# Patient Record
Sex: FEMALE | Race: OTHER | HISPANIC OR LATINO
[De-identification: names, ages, dates, MRNs, and addresses within clinical notes are randomized per-mention and may not be internally consistent; named-entity substitution may affect disease eponyms.]

---

## 2018-06-13 ENCOUNTER — ASOB RESULT (OUTPATIENT)
Age: 33
End: 2018-06-13

## 2018-06-13 ENCOUNTER — APPOINTMENT (OUTPATIENT)
Dept: ANTEPARTUM | Facility: CLINIC | Age: 33
End: 2018-06-13
Payer: MEDICAID

## 2018-06-13 PROBLEM — Z00.00 ENCOUNTER FOR PREVENTIVE HEALTH EXAMINATION: Status: ACTIVE | Noted: 2018-06-13

## 2018-06-13 PROCEDURE — 76817 TRANSVAGINAL US OBSTETRIC: CPT

## 2018-08-16 ENCOUNTER — APPOINTMENT (OUTPATIENT)
Dept: ANTEPARTUM | Facility: CLINIC | Age: 33
End: 2018-08-16
Payer: MEDICAID

## 2018-08-16 ENCOUNTER — ASOB RESULT (OUTPATIENT)
Age: 33
End: 2018-08-16

## 2018-08-16 PROCEDURE — 76817 TRANSVAGINAL US OBSTETRIC: CPT

## 2018-08-16 PROCEDURE — 76811 OB US DETAILED SNGL FETUS: CPT

## 2018-08-29 ENCOUNTER — APPOINTMENT (OUTPATIENT)
Dept: ANTEPARTUM | Facility: CLINIC | Age: 33
End: 2018-08-29
Payer: MEDICAID

## 2018-08-29 ENCOUNTER — ASOB RESULT (OUTPATIENT)
Age: 33
End: 2018-08-29

## 2018-08-29 PROCEDURE — 76816 OB US FOLLOW-UP PER FETUS: CPT

## 2018-12-20 ENCOUNTER — OUTPATIENT (OUTPATIENT)
Dept: OUTPATIENT SERVICES | Facility: HOSPITAL | Age: 33
LOS: 1 days | End: 2018-12-20
Payer: MEDICAID

## 2018-12-20 ENCOUNTER — APPOINTMENT (OUTPATIENT)
Dept: ANTEPARTUM | Facility: CLINIC | Age: 33
End: 2018-12-20
Payer: MEDICAID

## 2018-12-20 ENCOUNTER — ASOB RESULT (OUTPATIENT)
Age: 33
End: 2018-12-20

## 2018-12-20 DIAGNOSIS — Z01.818 ENCOUNTER FOR OTHER PREPROCEDURAL EXAMINATION: ICD-10-CM

## 2018-12-20 LAB
APPEARANCE UR: CLEAR — SIGNIFICANT CHANGE UP
BASOPHILS # BLD AUTO: 0 K/UL — SIGNIFICANT CHANGE UP (ref 0–0.2)
BASOPHILS NFR BLD AUTO: 0.2 % — SIGNIFICANT CHANGE UP (ref 0–2)
BILIRUB UR-MCNC: NEGATIVE — SIGNIFICANT CHANGE UP
BLD GP AB SCN SERPL QL: SIGNIFICANT CHANGE UP
COLOR SPEC: YELLOW — SIGNIFICANT CHANGE UP
DIFF PNL FLD: ABNORMAL
EOSINOPHIL # BLD AUTO: 0 K/UL — SIGNIFICANT CHANGE UP (ref 0–0.5)
EOSINOPHIL NFR BLD AUTO: 0.5 % — SIGNIFICANT CHANGE UP (ref 0–6)
EPI CELLS # UR: SIGNIFICANT CHANGE UP
GLUCOSE UR QL: NEGATIVE MG/DL — SIGNIFICANT CHANGE UP
HCT VFR BLD CALC: 32.4 % — LOW (ref 37–47)
HGB BLD-MCNC: 10.1 G/DL — LOW (ref 12–16)
KETONES UR-MCNC: NEGATIVE — SIGNIFICANT CHANGE UP
LEUKOCYTE ESTERASE UR-ACNC: NEGATIVE — SIGNIFICANT CHANGE UP
LYMPHOCYTES # BLD AUTO: 1.9 K/UL — SIGNIFICANT CHANGE UP (ref 1–4.8)
LYMPHOCYTES # BLD AUTO: 21.3 % — SIGNIFICANT CHANGE UP (ref 20–55)
MCHC RBC-ENTMCNC: 26.4 PG — LOW (ref 27–31)
MCHC RBC-ENTMCNC: 31.2 G/DL — LOW (ref 32–36)
MCV RBC AUTO: 84.8 FL — SIGNIFICANT CHANGE UP (ref 81–99)
MONOCYTES # BLD AUTO: 0.8 K/UL — SIGNIFICANT CHANGE UP (ref 0–0.8)
MONOCYTES NFR BLD AUTO: 9.3 % — SIGNIFICANT CHANGE UP (ref 3–10)
NEUTROPHILS # BLD AUTO: 6 K/UL — SIGNIFICANT CHANGE UP (ref 1.8–8)
NEUTROPHILS NFR BLD AUTO: 68.5 % — SIGNIFICANT CHANGE UP (ref 37–73)
NITRITE UR-MCNC: NEGATIVE — SIGNIFICANT CHANGE UP
PH UR: 7 — SIGNIFICANT CHANGE UP (ref 5–8)
PLATELET # BLD AUTO: 386 K/UL — SIGNIFICANT CHANGE UP (ref 150–400)
PROT UR-MCNC: NEGATIVE MG/DL — SIGNIFICANT CHANGE UP
RBC # BLD: 3.82 M/UL — LOW (ref 4.4–5.2)
RBC # FLD: 14.6 % — SIGNIFICANT CHANGE UP (ref 11–15.6)
RBC CASTS # UR COMP ASSIST: SIGNIFICANT CHANGE UP /HPF (ref 0–4)
SP GR SPEC: 1 — LOW (ref 1.01–1.02)
TYPE + AB SCN PNL BLD: SIGNIFICANT CHANGE UP
UROBILINOGEN FLD QL: NEGATIVE MG/DL — SIGNIFICANT CHANGE UP
WBC # BLD: 8.7 K/UL — SIGNIFICANT CHANGE UP (ref 4.8–10.8)
WBC # FLD AUTO: 8.7 K/UL — SIGNIFICANT CHANGE UP (ref 4.8–10.8)
WBC UR QL: SIGNIFICANT CHANGE UP

## 2018-12-20 PROCEDURE — 86901 BLOOD TYPING SEROLOGIC RH(D): CPT

## 2018-12-20 PROCEDURE — 86900 BLOOD TYPING SEROLOGIC ABO: CPT

## 2018-12-20 PROCEDURE — 36415 COLL VENOUS BLD VENIPUNCTURE: CPT

## 2018-12-20 PROCEDURE — 81001 URINALYSIS AUTO W/SCOPE: CPT

## 2018-12-20 PROCEDURE — 76816 OB US FOLLOW-UP PER FETUS: CPT

## 2018-12-20 PROCEDURE — 86850 RBC ANTIBODY SCREEN: CPT

## 2018-12-20 PROCEDURE — 85027 COMPLETE CBC AUTOMATED: CPT

## 2018-12-20 PROCEDURE — 86780 TREPONEMA PALLIDUM: CPT

## 2018-12-21 LAB — T PALLIDUM AB TITR SER: NEGATIVE — SIGNIFICANT CHANGE UP

## 2018-12-27 VITALS
SYSTOLIC BLOOD PRESSURE: 103 MMHG | DIASTOLIC BLOOD PRESSURE: 51 MMHG | TEMPERATURE: 98 F | HEART RATE: 83 BPM | RESPIRATION RATE: 18 BRPM

## 2018-12-27 VITALS — HEIGHT: 66 IN | WEIGHT: 127.87 LBS

## 2018-12-28 ENCOUNTER — TRANSCRIPTION ENCOUNTER (OUTPATIENT)
Age: 33
End: 2018-12-28

## 2018-12-29 ENCOUNTER — RESULT REVIEW (OUTPATIENT)
Age: 33
End: 2018-12-29

## 2018-12-29 ENCOUNTER — TRANSCRIPTION ENCOUNTER (OUTPATIENT)
Age: 33
End: 2018-12-29

## 2018-12-29 ENCOUNTER — INPATIENT (INPATIENT)
Facility: HOSPITAL | Age: 33
LOS: 1 days | Discharge: HOME | End: 2018-12-31
Attending: OBSTETRICS & GYNECOLOGY | Admitting: OBSTETRICS & GYNECOLOGY
Payer: MEDICAID

## 2018-12-29 VITALS
RESPIRATION RATE: 18 BRPM | DIASTOLIC BLOOD PRESSURE: 66 MMHG | SYSTOLIC BLOOD PRESSURE: 113 MMHG | TEMPERATURE: 98 F | WEIGHT: 171.96 LBS | HEART RATE: 78 BPM | HEIGHT: 66 IN

## 2018-12-29 DIAGNOSIS — O34.219 MATERNAL CARE FOR UNSPECIFIED TYPE SCAR FROM PREVIOUS CESAREAN DELIVERY: ICD-10-CM

## 2018-12-29 DIAGNOSIS — O34.211 MATERNAL CARE FOR LOW TRANSVERSE SCAR FROM PREVIOUS CESAREAN DELIVERY: ICD-10-CM

## 2018-12-29 LAB
BLD GP AB SCN SERPL QL: SIGNIFICANT CHANGE UP
TYPE + AB SCN PNL BLD: SIGNIFICANT CHANGE UP

## 2018-12-29 PROCEDURE — 88304 TISSUE EXAM BY PATHOLOGIST: CPT | Mod: 26

## 2018-12-29 PROCEDURE — 88302 TISSUE EXAM BY PATHOLOGIST: CPT | Mod: 26

## 2018-12-29 RX ORDER — SODIUM CHLORIDE 9 MG/ML
1000 INJECTION, SOLUTION INTRAVENOUS
Qty: 0 | Refills: 0 | Status: DISCONTINUED | OUTPATIENT
Start: 2018-12-29 | End: 2018-12-31

## 2018-12-29 RX ORDER — SODIUM CHLORIDE 9 MG/ML
1000 INJECTION, SOLUTION INTRAVENOUS
Qty: 0 | Refills: 0 | Status: DISCONTINUED | OUTPATIENT
Start: 2018-12-29 | End: 2018-12-29

## 2018-12-29 RX ORDER — OXYTOCIN 10 UNIT/ML
41.67 VIAL (ML) INJECTION
Qty: 20 | Refills: 0 | Status: DISCONTINUED | OUTPATIENT
Start: 2018-12-29 | End: 2018-12-31

## 2018-12-29 RX ORDER — IBUPROFEN 200 MG
600 TABLET ORAL EVERY 6 HOURS
Qty: 0 | Refills: 0 | Status: DISCONTINUED | OUTPATIENT
Start: 2018-12-29 | End: 2018-12-31

## 2018-12-29 RX ORDER — FERROUS SULFATE 325(65) MG
325 TABLET ORAL DAILY
Qty: 0 | Refills: 0 | Status: DISCONTINUED | OUTPATIENT
Start: 2018-12-29 | End: 2018-12-31

## 2018-12-29 RX ORDER — TETANUS TOXOID, REDUCED DIPHTHERIA TOXOID AND ACELLULAR PERTUSSIS VACCINE, ADSORBED 5; 2.5; 8; 8; 2.5 [IU]/.5ML; [IU]/.5ML; UG/.5ML; UG/.5ML; UG/.5ML
0.5 SUSPENSION INTRAMUSCULAR ONCE
Qty: 0 | Refills: 0 | Status: COMPLETED | OUTPATIENT
Start: 2018-12-29

## 2018-12-29 RX ORDER — DIPHENHYDRAMINE HCL 50 MG
25 CAPSULE ORAL EVERY 6 HOURS
Qty: 0 | Refills: 0 | Status: DISCONTINUED | OUTPATIENT
Start: 2018-12-29 | End: 2018-12-31

## 2018-12-29 RX ORDER — OXYTOCIN 10 UNIT/ML
41.67 VIAL (ML) INJECTION
Qty: 20 | Refills: 0 | Status: DISCONTINUED | OUTPATIENT
Start: 2018-12-29 | End: 2018-12-29

## 2018-12-29 RX ORDER — NALOXONE HYDROCHLORIDE 4 MG/.1ML
0.1 SPRAY NASAL
Qty: 0 | Refills: 0 | Status: DISCONTINUED | OUTPATIENT
Start: 2018-12-29 | End: 2018-12-31

## 2018-12-29 RX ORDER — ACETAMINOPHEN 500 MG
1000 TABLET ORAL ONCE
Qty: 0 | Refills: 0 | Status: DISCONTINUED | OUTPATIENT
Start: 2018-12-29 | End: 2018-12-31

## 2018-12-29 RX ORDER — CEFAZOLIN SODIUM 1 G
2000 VIAL (EA) INJECTION ONCE
Qty: 0 | Refills: 0 | Status: COMPLETED | OUTPATIENT
Start: 2018-12-29 | End: 2018-12-29

## 2018-12-29 RX ORDER — ONDANSETRON 8 MG/1
4 TABLET, FILM COATED ORAL EVERY 6 HOURS
Qty: 0 | Refills: 0 | Status: DISCONTINUED | OUTPATIENT
Start: 2018-12-29 | End: 2018-12-31

## 2018-12-29 RX ORDER — SODIUM CHLORIDE 9 MG/ML
1000 INJECTION, SOLUTION INTRAVENOUS ONCE
Qty: 0 | Refills: 0 | Status: COMPLETED | OUTPATIENT
Start: 2018-12-29 | End: 2018-12-29

## 2018-12-29 RX ORDER — SIMETHICONE 80 MG/1
80 TABLET, CHEWABLE ORAL EVERY 4 HOURS
Qty: 0 | Refills: 0 | Status: DISCONTINUED | OUTPATIENT
Start: 2018-12-29 | End: 2018-12-31

## 2018-12-29 RX ORDER — LANOLIN
1 OINTMENT (GRAM) TOPICAL
Qty: 0 | Refills: 0 | Status: DISCONTINUED | OUTPATIENT
Start: 2018-12-29 | End: 2018-12-31

## 2018-12-29 RX ORDER — DOCUSATE SODIUM 100 MG
100 CAPSULE ORAL
Qty: 0 | Refills: 0 | Status: DISCONTINUED | OUTPATIENT
Start: 2018-12-29 | End: 2018-12-31

## 2018-12-29 RX ORDER — METOCLOPRAMIDE HCL 10 MG
10 TABLET ORAL ONCE
Qty: 0 | Refills: 0 | Status: DISCONTINUED | OUTPATIENT
Start: 2018-12-29 | End: 2018-12-29

## 2018-12-29 RX ORDER — KETOROLAC TROMETHAMINE 30 MG/ML
30 SYRINGE (ML) INJECTION EVERY 6 HOURS
Qty: 0 | Refills: 0 | Status: DISCONTINUED | OUTPATIENT
Start: 2018-12-29 | End: 2018-12-30

## 2018-12-29 RX ORDER — INFLUENZA VIRUS VACCINE 15; 15; 15; 15 UG/.5ML; UG/.5ML; UG/.5ML; UG/.5ML
0.5 SUSPENSION INTRAMUSCULAR ONCE
Qty: 0 | Refills: 0 | Status: COMPLETED | OUTPATIENT
Start: 2018-12-29 | End: 2018-12-31

## 2018-12-29 RX ORDER — GLYCERIN ADULT
1 SUPPOSITORY, RECTAL RECTAL AT BEDTIME
Qty: 0 | Refills: 0 | Status: DISCONTINUED | OUTPATIENT
Start: 2018-12-29 | End: 2018-12-31

## 2018-12-29 RX ORDER — OXYCODONE AND ACETAMINOPHEN 5; 325 MG/1; MG/1
1 TABLET ORAL
Qty: 0 | Refills: 0 | Status: DISCONTINUED | OUTPATIENT
Start: 2018-12-29 | End: 2018-12-31

## 2018-12-29 RX ORDER — CITRIC ACID/SODIUM CITRATE 300-500 MG
30 SOLUTION, ORAL ORAL ONCE
Qty: 0 | Refills: 0 | Status: COMPLETED | OUTPATIENT
Start: 2018-12-29 | End: 2018-12-29

## 2018-12-29 RX ORDER — OXYCODONE HYDROCHLORIDE 5 MG/1
10 TABLET ORAL
Qty: 0 | Refills: 0 | Status: DISCONTINUED | OUTPATIENT
Start: 2018-12-29 | End: 2018-12-31

## 2018-12-29 RX ORDER — OXYCODONE HYDROCHLORIDE 5 MG/1
5 TABLET ORAL
Qty: 0 | Refills: 0 | Status: DISCONTINUED | OUTPATIENT
Start: 2018-12-29 | End: 2018-12-31

## 2018-12-29 RX ORDER — DIPHENHYDRAMINE HCL 50 MG
50 CAPSULE ORAL EVERY 4 HOURS
Qty: 0 | Refills: 0 | Status: DISCONTINUED | OUTPATIENT
Start: 2018-12-29 | End: 2018-12-31

## 2018-12-29 RX ORDER — ONDANSETRON 8 MG/1
4 TABLET, FILM COATED ORAL EVERY 6 HOURS
Qty: 0 | Refills: 0 | Status: DISCONTINUED | OUTPATIENT
Start: 2018-12-29 | End: 2018-12-29

## 2018-12-29 RX ORDER — KETOROLAC TROMETHAMINE 30 MG/ML
30 SYRINGE (ML) INJECTION ONCE
Qty: 0 | Refills: 0 | Status: DISCONTINUED | OUTPATIENT
Start: 2018-12-29 | End: 2018-12-29

## 2018-12-29 RX ORDER — ENOXAPARIN SODIUM 100 MG/ML
40 INJECTION SUBCUTANEOUS EVERY 24 HOURS
Qty: 0 | Refills: 0 | Status: DISCONTINUED | OUTPATIENT
Start: 2018-12-29 | End: 2018-12-31

## 2018-12-29 RX ORDER — OXYCODONE AND ACETAMINOPHEN 5; 325 MG/1; MG/1
2 TABLET ORAL EVERY 6 HOURS
Qty: 0 | Refills: 0 | Status: DISCONTINUED | OUTPATIENT
Start: 2018-12-29 | End: 2018-12-31

## 2018-12-29 RX ORDER — OXYTOCIN 10 UNIT/ML
333.33 VIAL (ML) INJECTION
Qty: 20 | Refills: 0 | Status: DISCONTINUED | OUTPATIENT
Start: 2018-12-29 | End: 2018-12-31

## 2018-12-29 RX ADMIN — SODIUM CHLORIDE 2000 MILLILITER(S): 9 INJECTION, SOLUTION INTRAVENOUS at 08:11

## 2018-12-29 RX ADMIN — Medication 30 MILLIGRAM(S): at 14:15

## 2018-12-29 RX ADMIN — ENOXAPARIN SODIUM 40 MILLIGRAM(S): 100 INJECTION SUBCUTANEOUS at 23:10

## 2018-12-29 RX ADMIN — Medication 100 MILLIGRAM(S): at 10:14

## 2018-12-29 RX ADMIN — SODIUM CHLORIDE 125 MILLILITER(S): 9 INJECTION, SOLUTION INTRAVENOUS at 20:37

## 2018-12-29 RX ADMIN — Medication 30 MILLILITER(S): at 10:02

## 2018-12-29 RX ADMIN — Medication 30 MILLIGRAM(S): at 20:54

## 2018-12-29 RX ADMIN — SODIUM CHLORIDE 125 MILLILITER(S): 9 INJECTION, SOLUTION INTRAVENOUS at 08:58

## 2018-12-29 RX ADMIN — Medication 30 MILLIGRAM(S): at 14:30

## 2018-12-29 RX ADMIN — Medication 30 MILLIGRAM(S): at 20:39

## 2018-12-29 NOTE — DISCHARGE NOTE OB - PLAN OF CARE
Patient should transition to regular activity level. Resume regular diet. Patient should follow up with her OB for wound check in 1-2 weeks. Patient should call her doctor sooner if she develops fever or uncontrolled vaginal bleeding. Please call sooner if there are any other concerns. rapid recovery

## 2018-12-29 NOTE — OB NEONATOLOGY/PEDIATRICIAN DELIVERY SUMMARY - NSPEDSNEONOTESA_OBGYN_ALL_OB_FT
Baby Enrique Marley is a 39 wk FT AGA  born at 1051 on 18 via RC/S del to 34 yo  O+/GBS neg/ RPR NR/ HIV neg/ HepBSAg neg / RI mom with EDC 19.  Mom had good prenatal care.  No significant complications with pregnancy.  Previous C/S x 1.  L&D:  Mom admitted this morning for scheduled RC/S; not in labor; intact membranes; afebrile.  C/S under regional anesthesia.  Clear AF; spontaneous cry; vertex.  Routine care.  AS .  A/P: Term AGA male  delivered by C/S. BW 3760g.  Observe for respiratory distress.  Transition to routine NB care under PMD service.    Kg Ray MD

## 2018-12-29 NOTE — OB PROVIDER H&P - NSHPLABSRESULTS_GEN_ALL_CORE
Complete Blood Count + Automated Diff (12.20.18 @ 13:32)    WBC Count: 8.7 K/uL    Hemoglobin: 10.1 g/dL    Hematocrit: 32.4 %    Platelet Count - Automated: 386 K/uL

## 2018-12-29 NOTE — DISCHARGE NOTE OB - PATIENT PORTAL LINK FT
You can access the [facility] Patient Portal, offered by [facility], by registering with the following website: [email or web address]

## 2018-12-29 NOTE — DISCHARGE NOTE OB - CARE PLAN
Principal Discharge DX:	 delivery delivered  Goal:	rapid recovery  Assessment and plan of treatment:	Patient should transition to regular activity level. Resume regular diet. Patient should follow up with her OB for wound check in 1-2 weeks. Patient should call her doctor sooner if she develops fever or uncontrolled vaginal bleeding. Please call sooner if there are any other concerns.

## 2018-12-29 NOTE — OB RN DELIVERY SUMMARY - BABYS CARE PROVIDER NAME, OB PROFILE
Pt is requesting a refill of Zoloft 100mg; last prescribed by Dr. Flory Lockett on 8/14/18 qty 60 with 1 refill.  Dr. Lockett said pt transferred cared on 10/17 and they are no longer prescribing medications for her. Not quite sure who is to prescribe these now.  Looks like Dr. Haase saw her last on 9/19/18  Would you like to continue therapy?    Thanks!!  Gely Antonio, Pharmacy [facility]  [phone]     [place]

## 2018-12-29 NOTE — DISCHARGE NOTE OB - CARE PROVIDER_API CALL
Hosea Hernandez (MD), Obstetrics and Gynecology  [address]  [address]  [place] [address]  Phone: [phone]  Fax: [phone]

## 2018-12-29 NOTE — DISCHARGE NOTE OB - HOSPITAL COURSE
Pt is a 32 yo  who delivered via repeat  section. She was transferred to postpartum unit without complications during her stay. Upon discharge she is voiding, tolerating PO, ambulating, and pain is controlled.

## 2018-12-29 NOTE — DISCHARGE NOTE OB - MEDICATION SUMMARY - MEDICATIONS TO TAKE
I will START or STAY ON the medications listed below when I get home from the hospital:    Percocet 5/325 oral tablet  -- 1 tab(s) by mouth every 6 hours, As Needed -Moderate Pain (4 - 6) MDD:4  -- Indication: For severe pain    ibuprofen 600 mg oral tablet  -- 1 tab(s) by mouth every 6 hours, As needed, Mild Pain (1 - 3)  -- Indication: For Moderate pain

## 2018-12-29 NOTE — CHART NOTE - NSCHARTNOTEFT_GEN_A_CORE
Attending  Pt presents for an elective repeat c section  Risks and benefits of the surgery were explained  Labs normal

## 2018-12-30 LAB
BASOPHILS # BLD AUTO: 0 K/UL — SIGNIFICANT CHANGE UP (ref 0–0.2)
BASOPHILS # BLD AUTO: 0 K/UL — SIGNIFICANT CHANGE UP (ref 0–0.2)
BASOPHILS NFR BLD AUTO: 0.1 % — SIGNIFICANT CHANGE UP (ref 0–2)
BASOPHILS NFR BLD AUTO: 0.2 % — SIGNIFICANT CHANGE UP (ref 0–2)
EOSINOPHIL # BLD AUTO: 0 K/UL — SIGNIFICANT CHANGE UP (ref 0–0.5)
EOSINOPHIL # BLD AUTO: 0.1 K/UL — SIGNIFICANT CHANGE UP (ref 0–0.5)
EOSINOPHIL NFR BLD AUTO: 0.4 % — SIGNIFICANT CHANGE UP (ref 0–6)
EOSINOPHIL NFR BLD AUTO: 0.9 % — SIGNIFICANT CHANGE UP (ref 0–6)
HCT VFR BLD CALC: 25.1 % — LOW (ref 37–47)
HCT VFR BLD CALC: 26.7 % — LOW (ref 37–47)
HGB BLD-MCNC: 8 G/DL — LOW (ref 12–16)
HGB BLD-MCNC: 8.7 G/DL — LOW (ref 12–16)
LYMPHOCYTES # BLD AUTO: 1.5 K/UL — SIGNIFICANT CHANGE UP (ref 1–4.8)
LYMPHOCYTES # BLD AUTO: 1.7 K/UL — SIGNIFICANT CHANGE UP (ref 1–4.8)
LYMPHOCYTES # BLD AUTO: 12.4 % — LOW (ref 20–55)
LYMPHOCYTES # BLD AUTO: 12.6 % — LOW (ref 20–55)
MCHC RBC-ENTMCNC: 26 PG — LOW (ref 27–31)
MCHC RBC-ENTMCNC: 26.6 PG — LOW (ref 27–31)
MCHC RBC-ENTMCNC: 31.9 G/DL — LOW (ref 32–36)
MCHC RBC-ENTMCNC: 32.6 G/DL — SIGNIFICANT CHANGE UP (ref 32–36)
MCV RBC AUTO: 81.5 FL — SIGNIFICANT CHANGE UP (ref 81–99)
MCV RBC AUTO: 81.7 FL — SIGNIFICANT CHANGE UP (ref 81–99)
MONOCYTES # BLD AUTO: 0.6 K/UL — SIGNIFICANT CHANGE UP (ref 0–0.8)
MONOCYTES # BLD AUTO: 0.8 K/UL — SIGNIFICANT CHANGE UP (ref 0–0.8)
MONOCYTES NFR BLD AUTO: 4.9 % — SIGNIFICANT CHANGE UP (ref 3–10)
MONOCYTES NFR BLD AUTO: 6 % — SIGNIFICANT CHANGE UP (ref 3–10)
NEUTROPHILS # BLD AUTO: 11 K/UL — HIGH (ref 1.8–8)
NEUTROPHILS # BLD AUTO: 9.5 K/UL — HIGH (ref 1.8–8)
NEUTROPHILS NFR BLD AUTO: 80.9 % — HIGH (ref 37–73)
NEUTROPHILS NFR BLD AUTO: 81.2 % — HIGH (ref 37–73)
PLATELET # BLD AUTO: 360 K/UL — SIGNIFICANT CHANGE UP (ref 150–400)
PLATELET # BLD AUTO: 374 K/UL — SIGNIFICANT CHANGE UP (ref 150–400)
RBC # BLD: 3.08 M/UL — LOW (ref 4.4–5.2)
RBC # BLD: 3.27 M/UL — LOW (ref 4.4–5.2)
RBC # FLD: 15 % — SIGNIFICANT CHANGE UP (ref 11–15.6)
RBC # FLD: 15.2 % — SIGNIFICANT CHANGE UP (ref 11–15.6)
T PALLIDUM AB TITR SER: NEGATIVE — SIGNIFICANT CHANGE UP
WBC # BLD: 11.7 K/UL — HIGH (ref 4.8–10.8)
WBC # BLD: 13.6 K/UL — HIGH (ref 4.8–10.8)
WBC # FLD AUTO: 11.7 K/UL — HIGH (ref 4.8–10.8)
WBC # FLD AUTO: 13.6 K/UL — HIGH (ref 4.8–10.8)

## 2018-12-30 RX ADMIN — Medication 325 MILLIGRAM(S): at 15:40

## 2018-12-30 RX ADMIN — ENOXAPARIN SODIUM 40 MILLIGRAM(S): 100 INJECTION SUBCUTANEOUS at 23:19

## 2018-12-30 RX ADMIN — Medication 30 MILLIGRAM(S): at 09:45

## 2018-12-30 RX ADMIN — Medication 30 MILLIGRAM(S): at 02:27

## 2018-12-30 RX ADMIN — SIMETHICONE 80 MILLIGRAM(S): 80 TABLET, CHEWABLE ORAL at 20:39

## 2018-12-30 RX ADMIN — OXYCODONE AND ACETAMINOPHEN 1 TABLET(S): 5; 325 TABLET ORAL at 20:39

## 2018-12-30 RX ADMIN — Medication 100 MILLIGRAM(S): at 09:30

## 2018-12-30 RX ADMIN — OXYCODONE AND ACETAMINOPHEN 1 TABLET(S): 5; 325 TABLET ORAL at 21:30

## 2018-12-30 RX ADMIN — Medication 30 MILLIGRAM(S): at 15:45

## 2018-12-30 RX ADMIN — SIMETHICONE 80 MILLIGRAM(S): 80 TABLET, CHEWABLE ORAL at 09:00

## 2018-12-30 RX ADMIN — Medication 30 MILLIGRAM(S): at 02:12

## 2018-12-30 RX ADMIN — Medication 30 MILLIGRAM(S): at 09:30

## 2018-12-30 RX ADMIN — Medication 30 MILLIGRAM(S): at 16:00

## 2018-12-30 RX ADMIN — Medication 1 TABLET(S): at 15:41

## 2018-12-30 NOTE — PROGRESS NOTE ADULT - ASSESSMENT
33y  s/p uncomplicated repeat CS and BTL POD #1 due to previous CS. Post-op CBC pending collection this AM. VSS.   Flatus pending.

## 2018-12-31 VITALS
SYSTOLIC BLOOD PRESSURE: 108 MMHG | TEMPERATURE: 98 F | DIASTOLIC BLOOD PRESSURE: 70 MMHG | RESPIRATION RATE: 18 BRPM | HEART RATE: 92 BPM

## 2018-12-31 RX ORDER — TETANUS TOXOID, REDUCED DIPHTHERIA TOXOID AND ACELLULAR PERTUSSIS VACCINE, ADSORBED 5; 2.5; 8; 8; 2.5 [IU]/.5ML; [IU]/.5ML; UG/.5ML; UG/.5ML; UG/.5ML
0.5 SUSPENSION INTRAMUSCULAR ONCE
Qty: 0 | Refills: 0 | Status: COMPLETED | OUTPATIENT
Start: 2018-12-31 | End: 2018-12-31

## 2018-12-31 RX ORDER — IBUPROFEN 200 MG
1 TABLET ORAL
Qty: 30 | Refills: 0 | OUTPATIENT
Start: 2018-12-31

## 2018-12-31 RX ADMIN — TETANUS TOXOID, REDUCED DIPHTHERIA TOXOID AND ACELLULAR PERTUSSIS VACCINE, ADSORBED 0.5 MILLILITER(S): 5; 2.5; 8; 8; 2.5 SUSPENSION INTRAMUSCULAR at 12:10

## 2018-12-31 RX ADMIN — Medication 600 MILLIGRAM(S): at 09:30

## 2018-12-31 RX ADMIN — INFLUENZA VIRUS VACCINE 0.5 MILLILITER(S): 15; 15; 15; 15 SUSPENSION INTRAMUSCULAR at 12:02

## 2018-12-31 RX ADMIN — Medication 325 MILLIGRAM(S): at 11:54

## 2018-12-31 RX ADMIN — Medication 1 TABLET(S): at 11:54

## 2018-12-31 RX ADMIN — Medication 600 MILLIGRAM(S): at 08:30

## 2018-12-31 NOTE — PROGRESS NOTE ADULT - ASSESSMENT
33y  s/p uncomplicated repeat CS and BS POD #2 due to previous CS. Post-op CBC reviewed, Hgb dropped from 10.1 > 8.7 > 8.0. Asymptomatic. May consider oral iron + colace.  Vital signs stable.  Gas pain - advised increased ambulation, gum chew, and take simethicone as prescribed.  Pt meeting appropriate milestones.  Per Dr. Hernandez's note, plan to DC pt today.

## 2018-12-31 NOTE — PROGRESS NOTE ADULT - SUBJECTIVE AND OBJECTIVE BOX
INTERVAL HPI/OVERNIGHT EVENTS:  33y Female s/p c section under spinal anesthesia with duramorph for post op analgesia on 12/29/18    Vital Signs Last 24 Hrs  T(C): 36.6 (30 Dec 2018 04:55), Max: 37 (29 Dec 2018 13:45)  T(F): 97.8 (30 Dec 2018 04:55), Max: 98.6 (29 Dec 2018 13:45)  HR: 91 (30 Dec 2018 04:55) (74 - 91)  BP: 106/68 (30 Dec 2018 04:55) (104/64 - 126/81)  BP(mean): --  RR: 18 (30 Dec 2018 04:55) (17 - 18)  SpO2: 100% (29 Dec 2018 13:45) (100% - 100%)    Patient seen, doing well, no anesthetic complications or complaints noted or reported.  Pain is controlled.
Name: DOTTIE CRAFT  MRN: [id number]  Date Admitted: 18  Location: [facility] 2EST 2016 ([facility] 2EST)  Attending: Hosea Hernandez    All: No Known Allergies    Post Partum Note:     DOTTIE CRAFT is a 33y  s/p uncomplicated repeat CS and BS POD #2 due to previous CS.    SUBJECTIVE:  No acute events overnight. Pain is well controlled with PRN pain medication. No problems with ambulating, voiding, or PO intake. Has had flatus but no BM. Complaining of diffuse gas pain. Denies N/V. Patient is having minimal lochia which is decreasing.    She is breastfeeding and supplementing with formula.    OBJECTIVE:  Physical exam:  General: AOx3, NAD.  Abdomen: Soft, appropriately tender, nondistended, no guarding or rebound tenderness, firm uterine fundus at umbilicus, the incision is clean dry and intact with sutures and steristrips. No erythema or discharge.  Ext: No DVT signs, warm extremities.    Vital Signs Last 24 Hrs  T(C): 36.8 (31 Dec 2018 04:15), Max: 36.8 (30 Dec 2018 09:24)  T(F): 98.3 (31 Dec 2018 04:15), Max: 98.3 (31 Dec 2018 04:15)  HR: 89 (31 Dec 2018 04:15) (80 - 100)  BP: 100/62 (31 Dec 2018 04:15) (100/62 - 106/76)  RR: 18 (31 Dec 2018 04:15) (18 - 19)  SpO2: 98% (31 Dec 2018 04:15) (98% - 99%)    LABS:                        8.0    13.6  )-----------( 374      ( 30 Dec 2018 19:32 )             25.1
Name: DOTTIE CRAFT  MRN: [id number]  Date Admitted: 18  Location: [facility] 2E2016 ([facility] 2EST)  Attending: Hosea Hernandez    All: No Known Allergies    Post Partum Note:     DOTTIE CRAFT is a 33y  s/p uncomplicated repeat CS and BTL POD #1 due to previous CS.    SUBJECTIVE:  No acute events overnight. Pain is well controlled with PRN pain medication. No problems with ambulating, voiding, or PO intake. Has not had flatus nor BM. Denies N/V. Patient is having minimal lochia which is decreasing.    She is breastfeeding and supplementing with formula.    OBJECTIVE:  Physical exam:  General: AOx3, NAD.  Heart: RRR. S1S2.  Lungs: CTABL. Good airflow bilaterally.   Abdomen: Soft, appropriately tender, nondistended, no guarding or rebound tenderness, firm uterine fundus at umbilicus, bandage unsaturated and removed, the incision is clean dry and intact with sutures and steristrips. No erythema or discharge.  Ext: No DVT signs, warm extremities.    Vital Signs Last 24 Hrs  T(C): 36.6 (30 Dec 2018 04:55), Max: 37 (29 Dec 2018 13:45)  T(F): 97.8 (30 Dec 2018 04:55), Max: 98.6 (29 Dec 2018 13:45)  HR: 91 (30 Dec 2018 04:55) (74 - 91)  BP: 106/68 (30 Dec 2018 04:55) (104/64 - 126/81)  RR: 18 (30 Dec 2018 04:55) (17 - 18)  SpO2: 100% (29 Dec 2018 13:45) (100% - 100%)    LABS:

## 2018-12-31 NOTE — PROGRESS NOTE ADULT - PROBLEM SELECTOR PLAN 1
1. Routine post-partum care.  2. On Lovenox. Encourage ambulation - if pt is not ambulating please use SCDs for DVT ppx.  3. Regular diet.  4. Encourage mother-baby interaction.  5. Plan for discharge today per Attending's approval.

## 2019-01-02 ENCOUNTER — EMERGENCY (EMERGENCY)
Facility: HOSPITAL | Age: 34
LOS: 1 days | Discharge: HOME | End: 2019-01-02
Attending: EMERGENCY MEDICINE
Payer: MEDICAID

## 2019-01-02 VITALS
DIASTOLIC BLOOD PRESSURE: 83 MMHG | RESPIRATION RATE: 20 BRPM | TEMPERATURE: 99 F | OXYGEN SATURATION: 99 % | HEIGHT: 65 IN | WEIGHT: 139.99 LBS | HEART RATE: 88 BPM | SYSTOLIC BLOOD PRESSURE: 112 MMHG

## 2019-01-02 LAB — SURGICAL PATHOLOGY STUDY: SIGNIFICANT CHANGE UP

## 2019-01-02 PROCEDURE — 99282 EMERGENCY DEPT VISIT SF MDM: CPT | Mod: 25

## 2019-01-02 PROCEDURE — 99283 EMERGENCY DEPT VISIT LOW MDM: CPT

## 2019-01-02 PROCEDURE — T1013: CPT

## 2019-01-02 RX ORDER — KETOROLAC TROMETHAMINE 30 MG/ML
30 SYRINGE (ML) INJECTION ONCE
Qty: 0 | Refills: 0 | Status: DISCONTINUED | OUTPATIENT
Start: 2019-01-02 | End: 2019-01-02

## 2019-01-02 RX ORDER — ACETAMINOPHEN 500 MG
650 TABLET ORAL ONCE
Qty: 0 | Refills: 0 | Status: COMPLETED | OUTPATIENT
Start: 2019-01-02 | End: 2019-01-02

## 2019-01-02 NOTE — ED ADULT NURSE NOTE - CHIEF COMPLAINT QUOTE
My breasts hurt, I have not been able to breastfeed since leaving here.  two days ago at [facility]

## 2019-01-02 NOTE — ED PROVIDER NOTE - OBJECTIVE STATEMENT
Patient is a 34 y/o female presenting with bilateral breast pain. Patient states she delivered at [facility] on saturday, , no complications. Patient states she breastfeed when she was in the hospital, but since returning home she ha snot breastfeed due to the pain she has been experiencing in her breasts. Patient denies fevers, cp, SOB, vaginal discharge, abdominal pain.

## 2019-01-02 NOTE — ED ADULT TRIAGE NOTE - CHIEF COMPLAINT QUOTE
My breasts hurt, I have not been able to breastfeed since leaving here.  two days ago at [facility]

## 2019-01-02 NOTE — ED PROVIDER NOTE - NSTIMEPROVIDERCAREINITIATE_GEN_ER
Writer spoke with pt and conveyed Dr. Thomas's message.  Pt expressed understanding and gratitude for call.   02-Jan-2019 00:23

## 2019-01-02 NOTE — ED PROVIDER NOTE - MEDICAL DECISION MAKING DETAILS
Pt with breast engorgement, has not been breastfeeding on demand, only  in hospital. worsening pain with difficulty latching with baby. has pump but not functioning properly. no fever. no concerning exam findings. recommend ICE, frozen cabbage leaves, motrin and tylenol. hand expression or new pump and continue encouraging  to feed to help relieve and empty breast. patient understands. will have Follow up with OB -Harleyey DO

## 2019-01-02 NOTE — ED PROVIDER NOTE - ATTENDING CONTRIBUTION TO CARE
I, Dariana Enrique, performed a face to face bedside interview with this patient regarding history of present illness, review of symptoms and relevant past medical, social and family history.  I completed an independent physical examination. Medical decision making, follow-up on ordered tests (ie labs, radiologic studies) and re-evaluation of the patient's status has been communicated to the ACP.  Disposition of the patient will be based on test outcome and response to ED interventions.     see mdm

## 2019-07-10 PROCEDURE — 88304 TISSUE EXAM BY PATHOLOGIST: CPT

## 2019-07-10 PROCEDURE — 59050 FETAL MONITOR W/REPORT: CPT

## 2019-07-10 PROCEDURE — 86850 RBC ANTIBODY SCREEN: CPT

## 2019-07-10 PROCEDURE — 86901 BLOOD TYPING SEROLOGIC RH(D): CPT

## 2019-07-10 PROCEDURE — 85027 COMPLETE CBC AUTOMATED: CPT

## 2019-07-10 PROCEDURE — 59025 FETAL NON-STRESS TEST: CPT

## 2019-07-10 PROCEDURE — 86900 BLOOD TYPING SEROLOGIC ABO: CPT

## 2019-07-10 PROCEDURE — 86780 TREPONEMA PALLIDUM: CPT

## 2019-07-10 PROCEDURE — T1013: CPT

## 2019-07-10 PROCEDURE — G0463: CPT

## 2019-07-10 PROCEDURE — 36415 COLL VENOUS BLD VENIPUNCTURE: CPT

## 2019-07-10 PROCEDURE — 88302 TISSUE EXAM BY PATHOLOGIST: CPT

## 2019-07-10 PROCEDURE — 90686 IIV4 VACC NO PRSV 0.5 ML IM: CPT

## 2019-07-10 PROCEDURE — 90715 TDAP VACCINE 7 YRS/> IM: CPT

## 2020-05-08 NOTE — OB RN DELIVERY SUMMARY - NSSKINTOSKINA_OBGYN_ALL_OB_START_DATE
1431  Transfusion completed, pt tolerated well; reviewed s/s of post transfusion reaction, when to contact MD, when to report to ER; reviewed hand hygiene, infection prevention; AVS declined, pt verbalized understanding of all discussed and when to report next   29-Dec-2018 11:42

## 2021-08-05 NOTE — ED ADULT TRIAGE NOTE - INTERPRETER'S NAME
Dipesh [facility] 73  coding opportunities          Chart reviewed, no opportunity found: CHART REVIEWED, NO OPPORTUNITY FOUND                     Patients insurance company: [employer] (Medicare and Commercial for [employer] and [employer]) Lawanda Alexis

## 2023-05-12 NOTE — OB RN INTRAOPERATIVE NOTE - NS_URGENCYSURGERY_OBGYN_ALL_OB
Scheduled Otc Regimen: Vanicream Deodorant/anti-perspirant \\nCortisone Cream PRN for flares Plan: Discussed addition of Rx strength topical steroid if needed in the future Detail Level: Simple